# Patient Record
Sex: MALE | Race: BLACK OR AFRICAN AMERICAN | NOT HISPANIC OR LATINO | Employment: UNEMPLOYED | URBAN - METROPOLITAN AREA
[De-identification: names, ages, dates, MRNs, and addresses within clinical notes are randomized per-mention and may not be internally consistent; named-entity substitution may affect disease eponyms.]

---

## 2019-09-22 ENCOUNTER — HOSPITAL ENCOUNTER (EMERGENCY)
Facility: HOSPITAL | Age: 19
Discharge: HOME | End: 2019-09-22
Attending: EMERGENCY MEDICINE
Payer: COMMERCIAL

## 2019-09-22 VITALS
HEART RATE: 62 BPM | OXYGEN SATURATION: 98 % | RESPIRATION RATE: 18 BRPM | DIASTOLIC BLOOD PRESSURE: 57 MMHG | BODY MASS INDEX: 21.1 KG/M2 | HEIGHT: 71 IN | SYSTOLIC BLOOD PRESSURE: 103 MMHG | TEMPERATURE: 99 F | WEIGHT: 150.69 LBS

## 2019-09-22 DIAGNOSIS — S93.411A SPRAIN OF CALCANEOFIBULAR LIGAMENT OF RIGHT ANKLE, INITIAL ENCOUNTER: Primary | ICD-10-CM

## 2019-09-22 DIAGNOSIS — M25.571 RIGHT ANKLE PAIN: ICD-10-CM

## 2019-09-22 LAB — HIV 1+2 AB+HIV1 P24 AG SERPL QL IA: NEGATIVE

## 2019-09-22 PROCEDURE — 99283 EMERGENCY DEPT VISIT LOW MDM: CPT | Mod: 25,ER

## 2019-09-22 PROCEDURE — 86703 HIV-1/HIV-2 1 RESULT ANTBDY: CPT

## 2019-09-22 RX ORDER — IBUPROFEN 600 MG/1
600 TABLET ORAL EVERY 6 HOURS PRN
Qty: 30 TABLET | Refills: 0 | Status: SHIPPED | OUTPATIENT
Start: 2019-09-22

## 2019-09-22 NOTE — DISCHARGE INSTRUCTIONS
The radiographs showed no fracture or dislocation.     See handout for instructions on RICE (rest, ice, compression, and elevation).

## 2019-09-22 NOTE — ED PROVIDER NOTES
"Encounter Date: 9/22/2019       History     Chief Complaint   Patient presents with    Joint Swelling     right "came down wrong while playing basketball"     The history is provided by the patient.   Leg Pain    The incident occurred in the yard (while playing basketball at [facility]). The injury mechanism was torsion (states that he "came down wrong on his ankle while playing basketball"). The incident occurred 1 to 2 hours ago (at approximately 1330 hours). The pain is present in the right ankle (lateral malleolus). The quality of the pain is described as aching. The pain is at a severity of 3/10. The pain has been fluctuating since onset. Pertinent negatives include no numbness, no inability to bear weight, no loss of motion, no muscle weakness, no loss of sensation and no tingling. The symptoms are aggravated by palpation, bearing weight and activity. He has tried nothing for the symptoms.         PCP:     Primary Doctor No        Review of patient's allergies indicates:  No Known Allergies  History reviewed. No pertinent past medical history.  History reviewed. No pertinent surgical history.  History reviewed. No pertinent family history.  Social History     Tobacco Use    Smoking status: Never Smoker    Smokeless tobacco: Never Used   Substance Use Topics    Alcohol use: Not Currently     Frequency: Never    Drug use: Not Currently     Review of Systems   Constitutional: Negative for chills and fever.   HENT: Negative for congestion and sore throat.    Respiratory: Negative for cough, chest tightness and shortness of breath.    Cardiovascular: Negative for chest pain and palpitations.   Gastrointestinal: Negative for abdominal pain, diarrhea, nausea and vomiting.   Genitourinary: Negative for dysuria.   Musculoskeletal: Positive for arthralgias (right ankle) and joint swelling (right ankle). Negative for back pain and neck pain.   Skin: Negative for color change, rash and wound. "   Neurological: Negative for dizziness, tingling, weakness, numbness and headaches.   Hematological: Does not bruise/bleed easily.   Psychiatric/Behavioral: Negative for confusion.       Physical Exam     Initial Vitals [09/22/19 1457]   BP Pulse Resp Temp SpO2   (!) 103/57 62 18 99.4 °F (37.4 °C) 98 %      MAP       --         Physical Exam    Nursing note and vitals reviewed.  Constitutional: Vital signs are normal. He appears well-developed and well-nourished. He is cooperative. He does not appear ill. No distress.   HENT:   Head: Normocephalic and atraumatic.   Right Ear: Hearing and external ear normal.   Left Ear: Hearing and external ear normal.   Nose: Nose normal.   Mouth/Throat: Uvula is midline, oropharynx is clear and moist and mucous membranes are normal.   Eyes: Conjunctivae, EOM and lids are normal. Pupils are equal, round, and reactive to light.   Neck: Trachea normal and normal range of motion. Neck supple.   Cardiovascular: Normal rate, regular rhythm, intact distal pulses and normal pulses.   Pulmonary/Chest: Effort normal. No accessory muscle usage. No respiratory distress.   Musculoskeletal: Normal range of motion. He exhibits no edema.        Right ankle: He exhibits swelling. He exhibits normal range of motion, no deformity and normal pulse. Tenderness. Lateral malleolus (mild swelling with reproducible tenderness noted to right lateral malleolus - no crepitus noted - neurovascular intact distally) tenderness found. Achilles tendon normal.        Feet:    Neurological: He is alert and oriented to person, place, and time. He has normal strength. No sensory deficit. Gait normal. GCS eye subscore is 4. GCS verbal subscore is 5. GCS motor subscore is 6.   Neurovascular intact to all extremities.    Skin: Skin is warm, dry and intact. Capillary refill takes less than 2 seconds. No abrasion, no bruising, no ecchymosis and no rash noted.   Psychiatric: He has a normal mood and affect. His speech is  normal and behavior is normal. Cognition and memory are normal.         ED Course   Procedures       ED Imaging Results:   Imaging Results          X-Ray Ankle Complete Right (Final result)  Result time 09/22/19 15:29:28    Final result by Deep Andrade MD (09/22/19 15:29:28)                 Impression:      1.  Negative for acute process involving the visualized osseous structures.    2.  Mild soft tissue swelling overlies the lateral malleolus.      Electronically signed by: Deep Andrade MD  Date:    09/22/2019  Time:    15:29             Narrative:    EXAMINATION:  XR ANKLE COMPLETE 3 VIEW RIGHT    CLINICAL HISTORY:  Pain in right ankle and joints of right foot    TECHNIQUE:  AP, lateral, and oblique images of the right ankle were performed.    COMPARISON:  None    FINDINGS:  Mortise is intact.  Talar dome is smooth.  Negative for fracture or dislocation.  Mild soft tissue swelling overlies the lateral malleolus.                                  1545 HOURS RE-EVALUATION & DISPOSITION:   Reassessment at the time of disposition demonstrates that the patient is resting comfortably in no acute distress.  He has remained hemodynamically stable throughout the entire ED visit and is without objective evidence for acute process requiring urgent intervention or hospitalization. I discussed test results and provided counseling to patient with regard to condition, the treatment plan, specific conditions for return, and the importance of follow up.  Answered questions at this time. The patient is stable for discharge.         X-Rays:   Independently Interpreted Readings:   Other Readings:  Radiographs of the right ankle reveal no acute findings.     Medical Decision Making:   Clinical Tests:   Radiological Study: Ordered and Reviewed                      Clinical Impression:       ICD-10-CM ICD-9-CM   1. Sprain of calcaneofibular ligament of right ankle, initial encounter S93.411A 845.02   2. Right ankle pain M25.571  719.47           Disposition:   Disposition: Discharged  Condition: Stable  I discussed with patient that the evaluation in the emergency department does not suggest any emergent or life threatening medical condition requiring immediate intervention beyond what was provided in the ED, and I believe patient is safe for discharge.  Regardless, an unremarkable evaluation in the ED does not preclude the development or presence of a serious of life threatening condition. As such, patient was instructed to return immediately for any worsening or change in current symptoms. I also discussed the results of my evaluation and diagnostics with patient and he concurs with the evaluation and management plan.  Detailed written and verbal instructions provided to patient and he expressed a verbal understanding of the discharge instructions and overall management plan. Reiterated the importance of medication administration and safety and advised patient to follow up with primary care provider in 3-5 days or sooner if needed.  Also advised patient to return to the ER for any complications.     Regarding ANKLE PAIN, for treatment, patient instructed to: rest ankle for several days without applying weight on ankle; use an ACE bandage or ankle brace; consider using crutches or a cane to help take the weight off a sore or unsteady ankle; keep foot/ankle elevated; apply ice to area immediately and for 10-15 minutes every hour for the first day, then, apply ice every 3-4 hours for 2 more days; and try over-the-counter Tylenol or Ibuprofen for pain and swelling. Patient advised to notify primary care provider or return to ED if swelling does not decrease within 2-3 days or notice signs or symptoms of infection (redness, increased pain, fever, and warmth around ankle); or if they notice a popping sound and have immediate trouble using or moving ankle. For prevention, patient advised to obtain/maintain healthy weight; warm up before  exercising; avoid sports and activities in which proper conditioning has not been achieved; ensure that shoes fit properly; use ankle support braces, work on balance, and do agility exercises.         New Prescriptions    IBUPROFEN (ADVIL,MOTRIN) 600 MG TABLET    Take 1 tablet (600 mg total) by mouth every 6 (six) hours as needed for Pain.         Follow-up Information     Call  Primary Care Provider.    Why:  If symptoms worsen or as needed           Go to  [facility].    Why:  As needed  Contact information:  [address] [facility] [id number]                                Vasiliy Roque NP  09/22/19 1712